# Patient Record
Sex: FEMALE | Race: BLACK OR AFRICAN AMERICAN | NOT HISPANIC OR LATINO
[De-identification: names, ages, dates, MRNs, and addresses within clinical notes are randomized per-mention and may not be internally consistent; named-entity substitution may affect disease eponyms.]

---

## 2021-02-25 ENCOUNTER — RESULT REVIEW (OUTPATIENT)
Age: 36
End: 2021-02-25

## 2021-03-22 PROBLEM — Z00.00 ENCOUNTER FOR PREVENTIVE HEALTH EXAMINATION: Status: ACTIVE | Noted: 2021-03-22

## 2021-03-25 ENCOUNTER — APPOINTMENT (OUTPATIENT)
Dept: OTOLARYNGOLOGY | Facility: CLINIC | Age: 36
End: 2021-03-25
Payer: COMMERCIAL

## 2021-03-25 VITALS
OXYGEN SATURATION: 100 % | WEIGHT: 180 LBS | SYSTOLIC BLOOD PRESSURE: 146 MMHG | BODY MASS INDEX: 25.77 KG/M2 | HEIGHT: 70 IN | HEART RATE: 82 BPM | DIASTOLIC BLOOD PRESSURE: 88 MMHG | TEMPERATURE: 97.8 F

## 2021-03-25 DIAGNOSIS — Z78.9 OTHER SPECIFIED HEALTH STATUS: ICD-10-CM

## 2021-03-25 DIAGNOSIS — R22.1 LOCALIZED SWELLING, MASS AND LUMP, NECK: ICD-10-CM

## 2021-03-25 DIAGNOSIS — Z82.49 FAMILY HISTORY OF ISCHEMIC HEART DISEASE AND OTHER DISEASES OF THE CIRCULATORY SYSTEM: ICD-10-CM

## 2021-03-25 DIAGNOSIS — E21.5 DISORDER OF PARATHYROID GLAND, UNSPECIFIED: ICD-10-CM

## 2021-03-25 DIAGNOSIS — Z86.79 PERSONAL HISTORY OF OTHER DISEASES OF THE CIRCULATORY SYSTEM: ICD-10-CM

## 2021-03-25 PROCEDURE — 76536 US EXAM OF HEAD AND NECK: CPT

## 2021-03-25 PROCEDURE — 31575 DIAGNOSTIC LARYNGOSCOPY: CPT

## 2021-03-25 PROCEDURE — 99072 ADDL SUPL MATRL&STAF TM PHE: CPT

## 2021-03-25 PROCEDURE — 99205 OFFICE O/P NEW HI 60 MIN: CPT | Mod: 25

## 2021-03-25 RX ORDER — AMLODIPINE AND OLMESARTAN MEDOXOMIL 10; 40 MG/1; MG/1
TABLET ORAL
Refills: 0 | Status: ACTIVE | COMMUNITY

## 2021-03-28 NOTE — PROCEDURE
[Image(s) Captured] : image(s) captured and filed [Unable to Cooperate with Mirror] : patient unable to cooperate with mirror [Gag Reflex] : gag reflex preventing mirror examination [Topical Lidocaine] : topical lidocaine [Oxymetazoline HCl] : oxymetazoline HCl [Flexible Endoscope] : examined with the flexible endoscope [Serial Number: ___] : Serial Number: [unfilled] [FreeTextEntry1] : Physician performed high-resolution ultrasound gray scale imaging and color Doppler supplementation of the thyroid gland and neck was obtained in the longitudinal and transverse planes using a 13 MHz linear transducer with image capture.  All measurements are in centimeters (longitudinal x AP x transverse).  \par  [FreeTextEntry2] : Primary hyperparathyroidism to rule out a parathyroid adenoma and or thyroid nodular disease. [FreeTextEntry3] : See dictated report [de-identified] : The nasal septum is minimally deviated to the left. There are no masses or polyps and the nasal mucosa and secretions are normal. The choanae and posterior nasopharynx are normal without masses or drainage. The Eustachian tube orifices appear patent. The pharynx, including the posterior and lateral pharyngeal walls, the vallecula and base of tongue are normal without ulcerations, lesions or masses. The hypopharynx including the pyriform sinuses open well without pooling of secretions, mucosal lesions or masses. The supraglottic larynx including the epiglottis, petiole, arytenoids, glossoepiglottic, aryepiglottic and pharyngoepiglottic folds are normal without mucosal lesions, ulcerations or masses. The glottis reveals normal false vocal folds. The true vocal folds are glistening white, tense and of equal length, without paralysis, having symmetric mobility on adduction and abduction. There are no mucosal lesions, nodules, cysts, erythroplasia or leukoplakia. The posterior cricoid area has healthy pink mucosa in the interarytenoid area and esophageal inlet. There is mild thickening/edema of the interarytenoid mucosa suggestive of posterior laryngitis from laryngopharyngeal acid reflux disease. The trachea is clear without narrowing in the immediate subglottic region, without deviation or lesions.  [de-identified] : Preoperative assessment, GERD

## 2021-03-28 NOTE — HISTORY OF PRESENT ILLNESS
[de-identified] : Iris is a generally healthy 35-year-old female  with the exception of controlled HTN who was first noted to have hypercalcemia in December 2020 (10.8 mg/dl)  that was initially monitored and then referred for Endocrine consultation.  Her last lab data is consistent with primary hyperparathyroidism with a serum calcium level of 10.6 mg/dl and a parathyroid hormone level of 72 pg/ml. Her renal function is normal and vit D 25-OH normal at 33.  Iris denies recent shortness of breath, voice changes, dysphagia, anterior neck pain, neck pressure or mass. There is no family history of thyroid cancer. Her mother is hypothyroid. Iris is euthyroid. She denies any known radiation exposures in her youth.  She denies calcium, vitamin D supplements, HCTZ or past use of Lithium Carbonate. There is no family history of nephrolithiasis or renal disease.  There is no history of fragility bone fractures. Other than chronic fatigue, muscle weakness, generalized bone aches, joint pain, memory loss, brain fog, nausea, constipation, polyuria/ polydipsia and GERD, she denies nephrolithiasis, peptic ulcer disease, pancreatitis, depression, vomiting or abdominal pain. She stated that she feels like an old lady due to the joint and bone pain and has not been able to exercise for ~ 1 year.  She is now referred for a surgical consultation to consider parathyroidectomy. The only neck imaging she had was a lymph node survey last month.  She had a nondiagnostic FNAB from a left level III LN that was borderline enlarged but since the FNA it has decreased in size and is being monitored. She denies fever, body aches, cough, cyanosis, chest burning, anosmia or recent known COVID exposures.  All family members at home are well.

## 2021-03-28 NOTE — CONSULT LETTER
[Dear  ___] : Dear  [unfilled], [Consult Letter:] : I had the pleasure of evaluating your patient, [unfilled]. [Please see my note below.] : Please see my note below. [Consult Closing:] : Thank you very much for allowing me to participate in the care of this patient.  If you have any questions, please do not hesitate to contact me. [Sincerely,] : Sincerely, [DrKyung  ___] : Dr. WORTHY [FreeTextEntry3] : \par Rommel Lam M.D., FACS, ECNU\par Director Center for Thyroid & Parathyroid Surgery\par [facility]\par Certified in Thyroid/Parathyroid/Neck Ultrasound, ECNU/ AIUM\par \par , Department of Otolaryngology\par [facility] [employer] at [facility]\par

## 2021-03-28 NOTE — DATA REVIEWED
[de-identified] : See HPI [de-identified] : See HPI [de-identified] : Endocrinology notes reviewed

## 2021-03-28 NOTE — REASON FOR VISIT
[FreeTextEntry2] : a surgical consultation concerning hypercalcemia and probable hyperparathyroidism [FreeTextEntry1] : Referred by Sara Watkins/Qasim Endocrinologist, PCP is Sunni Johnson MD

## 2021-04-05 ENCOUNTER — TRANSCRIPTION ENCOUNTER (OUTPATIENT)
Age: 36
End: 2021-04-05

## 2021-04-05 ENCOUNTER — APPOINTMENT (OUTPATIENT)
Dept: OTOLARYNGOLOGY | Facility: CLINIC | Age: 36
End: 2021-04-05
Payer: COMMERCIAL

## 2021-04-05 DIAGNOSIS — Z01.818 ENCOUNTER FOR OTHER PREPROCEDURAL EXAMINATION: ICD-10-CM

## 2021-04-05 PROCEDURE — ZZZZZ: CPT

## 2021-04-06 ENCOUNTER — TRANSCRIPTION ENCOUNTER (OUTPATIENT)
Age: 36
End: 2021-04-06

## 2021-04-06 VITALS
HEART RATE: 74 BPM | HEIGHT: 70 IN | OXYGEN SATURATION: 99 % | TEMPERATURE: 98 F | RESPIRATION RATE: 16 BRPM | SYSTOLIC BLOOD PRESSURE: 144 MMHG | DIASTOLIC BLOOD PRESSURE: 85 MMHG

## 2021-04-06 LAB — SARS-COV-2 N GENE NPH QL NAA+PROBE: NOT DETECTED

## 2021-04-06 NOTE — ASU PATIENT PROFILE, ADULT - PMH
Eczema    GERD (gastroesophageal reflux disease)    HTN (hypertension)    Hyperparathyroidism    IBS (irritable bowel syndrome)

## 2021-04-07 ENCOUNTER — RESULT REVIEW (OUTPATIENT)
Age: 36
End: 2021-04-07

## 2021-04-07 ENCOUNTER — APPOINTMENT (OUTPATIENT)
Dept: OTOLARYNGOLOGY | Facility: HOSPITAL | Age: 36
End: 2021-04-07

## 2021-04-07 ENCOUNTER — OUTPATIENT (OUTPATIENT)
Dept: INPATIENT UNIT | Facility: HOSPITAL | Age: 36
LOS: 1 days | Discharge: HOME | End: 2021-04-07
Payer: COMMERCIAL

## 2021-04-07 DIAGNOSIS — Z98.890 OTHER SPECIFIED POSTPROCEDURAL STATES: Chronic | ICD-10-CM

## 2021-04-07 LAB
ALBUMIN SERPL ELPH-MCNC: 3.7 G/DL — SIGNIFICANT CHANGE UP (ref 3.3–5)
ALBUMIN SERPL ELPH-MCNC: 3.8 G/DL — SIGNIFICANT CHANGE UP (ref 3.3–5)
ALP SERPL-CCNC: 66 U/L — SIGNIFICANT CHANGE UP (ref 40–120)
ALP SERPL-CCNC: 67 U/L — SIGNIFICANT CHANGE UP (ref 40–120)
ALT FLD-CCNC: 10 U/L — SIGNIFICANT CHANGE UP (ref 10–45)
ALT FLD-CCNC: 10 U/L — SIGNIFICANT CHANGE UP (ref 10–45)
ANION GAP SERPL CALC-SCNC: 10 MMOL/L — SIGNIFICANT CHANGE UP (ref 5–17)
ANION GAP SERPL CALC-SCNC: 11 MMOL/L — SIGNIFICANT CHANGE UP (ref 5–17)
AST SERPL-CCNC: 17 U/L — SIGNIFICANT CHANGE UP (ref 10–40)
AST SERPL-CCNC: 18 U/L — SIGNIFICANT CHANGE UP (ref 10–40)
BILIRUB SERPL-MCNC: 0.4 MG/DL — SIGNIFICANT CHANGE UP (ref 0.2–1.2)
BILIRUB SERPL-MCNC: 0.6 MG/DL — SIGNIFICANT CHANGE UP (ref 0.2–1.2)
BUN SERPL-MCNC: 8 MG/DL — SIGNIFICANT CHANGE UP (ref 7–23)
BUN SERPL-MCNC: 9 MG/DL — SIGNIFICANT CHANGE UP (ref 7–23)
CALCIUM SERPL-MCNC: 9.5 MG/DL — SIGNIFICANT CHANGE UP (ref 8.4–10.5)
CALCIUM SERPL-MCNC: 9.6 MG/DL — SIGNIFICANT CHANGE UP (ref 8.4–10.5)
CHLORIDE SERPL-SCNC: 102 MMOL/L — SIGNIFICANT CHANGE UP (ref 96–108)
CHLORIDE SERPL-SCNC: 102 MMOL/L — SIGNIFICANT CHANGE UP (ref 96–108)
CO2 SERPL-SCNC: 24 MMOL/L — SIGNIFICANT CHANGE UP (ref 22–31)
CO2 SERPL-SCNC: 24 MMOL/L — SIGNIFICANT CHANGE UP (ref 22–31)
CREAT SERPL-MCNC: 0.99 MG/DL — SIGNIFICANT CHANGE UP (ref 0.5–1.3)
CREAT SERPL-MCNC: 1.03 MG/DL — SIGNIFICANT CHANGE UP (ref 0.5–1.3)
GLUCOSE SERPL-MCNC: 117 MG/DL — HIGH (ref 70–99)
GLUCOSE SERPL-MCNC: 127 MG/DL — HIGH (ref 70–99)
HCT VFR BLD CALC: 38.5 % — SIGNIFICANT CHANGE UP (ref 34.5–45)
HGB BLD-MCNC: 12.8 G/DL — SIGNIFICANT CHANGE UP (ref 11.5–15.5)
MAGNESIUM SERPL-MCNC: 1.6 MG/DL — SIGNIFICANT CHANGE UP (ref 1.6–2.6)
MAGNESIUM SERPL-MCNC: 1.8 MG/DL — SIGNIFICANT CHANGE UP (ref 1.6–2.6)
MCHC RBC-ENTMCNC: 29.3 PG — SIGNIFICANT CHANGE UP (ref 27–34)
MCHC RBC-ENTMCNC: 33.2 GM/DL — SIGNIFICANT CHANGE UP (ref 32–36)
MCV RBC AUTO: 88.1 FL — SIGNIFICANT CHANGE UP (ref 80–100)
NRBC # BLD: 0 /100 WBCS — SIGNIFICANT CHANGE UP (ref 0–0)
PHOSPHATE SERPL-MCNC: 2.4 MG/DL — LOW (ref 2.5–4.5)
PHOSPHATE SERPL-MCNC: 2.4 MG/DL — LOW (ref 2.5–4.5)
PLATELET # BLD AUTO: 339 K/UL — SIGNIFICANT CHANGE UP (ref 150–400)
POTASSIUM SERPL-MCNC: 4.1 MMOL/L — SIGNIFICANT CHANGE UP (ref 3.5–5.3)
POTASSIUM SERPL-MCNC: 4.4 MMOL/L — SIGNIFICANT CHANGE UP (ref 3.5–5.3)
POTASSIUM SERPL-SCNC: 4.1 MMOL/L — SIGNIFICANT CHANGE UP (ref 3.5–5.3)
POTASSIUM SERPL-SCNC: 4.4 MMOL/L — SIGNIFICANT CHANGE UP (ref 3.5–5.3)
PROT SERPL-MCNC: 7.3 G/DL — SIGNIFICANT CHANGE UP (ref 6–8.3)
PROT SERPL-MCNC: 7.3 G/DL — SIGNIFICANT CHANGE UP (ref 6–8.3)
PTH-INTACT IO % DIF SERPL: 11.8 PG/ML — SIGNIFICANT CHANGE UP (ref 8.5–72.5)
PTH-INTACT IO % DIF SERPL: 119.4 PG/ML — HIGH (ref 8.5–72.5)
PTH-INTACT IO % DIF SERPL: 13.6 PG/ML — SIGNIFICANT CHANGE UP (ref 8.5–72.5)
PTH-INTACT IO % DIF SERPL: 22.3 PG/ML — SIGNIFICANT CHANGE UP (ref 8.5–72.5)
PTH-INTACT IO % DIF SERPL: 4.5 PG/ML — LOW (ref 8.5–72.5)
RBC # BLD: 4.37 M/UL — SIGNIFICANT CHANGE UP (ref 3.8–5.2)
RBC # FLD: 13.3 % — SIGNIFICANT CHANGE UP (ref 10.3–14.5)
SODIUM SERPL-SCNC: 136 MMOL/L — SIGNIFICANT CHANGE UP (ref 135–145)
SODIUM SERPL-SCNC: 137 MMOL/L — SIGNIFICANT CHANGE UP (ref 135–145)
WBC # BLD: 9.46 K/UL — SIGNIFICANT CHANGE UP (ref 3.8–10.5)
WBC # FLD AUTO: 9.46 K/UL — SIGNIFICANT CHANGE UP (ref 3.8–10.5)

## 2021-04-07 PROCEDURE — 60500 EXPLORE PARATHYROID GLANDS: CPT

## 2021-04-07 PROCEDURE — 38520 BIOPSY/REMOVAL LYMPH NODES: CPT

## 2021-04-07 PROCEDURE — 60512 AUTOTRANSPLANT PARATHYROID: CPT

## 2021-04-07 PROCEDURE — 88331 PATH CONSLTJ SURG 1 BLK 1SPC: CPT | Mod: 26

## 2021-04-07 PROCEDURE — 88305 TISSUE EXAM BY PATHOLOGIST: CPT | Mod: 26

## 2021-04-07 RX ORDER — SODIUM CHLORIDE 9 MG/ML
1000 INJECTION, SOLUTION INTRAVENOUS
Refills: 0 | Status: DISCONTINUED | OUTPATIENT
Start: 2021-04-07 | End: 2021-04-08

## 2021-04-07 RX ORDER — CALCITRIOL 0.5 UG/1
0.5 CAPSULE ORAL DAILY
Refills: 0 | Status: DISCONTINUED | OUTPATIENT
Start: 2021-04-07 | End: 2021-04-08

## 2021-04-07 RX ORDER — ACETAMINOPHEN 500 MG
650 TABLET ORAL ONCE
Refills: 0 | Status: DISCONTINUED | OUTPATIENT
Start: 2021-04-07 | End: 2021-04-08

## 2021-04-07 RX ORDER — ONDANSETRON 8 MG/1
4 TABLET, FILM COATED ORAL ONCE
Refills: 0 | Status: DISCONTINUED | OUTPATIENT
Start: 2021-04-07 | End: 2021-04-08

## 2021-04-07 RX ADMIN — CALCITRIOL 0.5 MICROGRAM(S): 0.5 CAPSULE ORAL at 21:47

## 2021-04-07 NOTE — PROGRESS NOTE ADULT - SUBJECTIVE AND OBJECTIVE BOX
Procedure: subtotal parathyroidectomy   Surgeon: Genaro    S: Pt has no complaints. Denies CP, SOB, FLORES, calf tenderness. Pain controlled with medication. Denies numbness, tingling or weakness.    O:  T(C): 36.6 (04-07-21 @ 20:15), Max: 36.6 (04-07-21 @ 20:15)  T(F): 97.8 (04-07-21 @ 20:15), Max: 97.8 (04-07-21 @ 20:15)  HR: 63 (04-07-21 @ 20:15) (55 - 65)  BP: 138/86 (04-07-21 @ 20:15) (128/77 - 138/86)  RR: 18 (04-07-21 @ 20:15) (16 - 24)  SpO2: 99% (04-07-21 @ 20:15) (99% - 100%)  Wt(kg): --                        12.8   9.46  )-----------( 339      ( 07 Apr 2021 19:44 )             38.5     04-07    136  |  102  |  8   ----------------------------<  127<H>  4.1   |  24  |  0.99    Ca    9.6      07 Apr 2021 19:43  Phos  2.4     04-07  Mg     1.8     04-07    TPro  7.3  /  Alb  3.8  /  TBili  0.4  /  DBili  x   /  AST  18  /  ALT  10  /  AlkPhos  66  04-07      Gen: NAD, resting comfortably in bed  NECK: Incision clean and dry without bleeding or swelling. LEVI intact with minimal SS fluid. Negative Chvostek sign.  C/V: NSR  Pulm: Nonlabored breathing, no respiratory distress  Extrem: WWP, no calf edema, SCDs in place      A/P: 36yFemale s/p subtotal parathyroidectomy   Diet: reg diet  IVF: LR until tolerating  Pain/nausea control  DVT ppx: scds

## 2021-04-07 NOTE — PACU DISCHARGE NOTE - COMMENTS
Verbal report given to DOLORES Pretty, v/s nuvia, pt. to be transferred to room 9617, voucing no complaints

## 2021-04-07 NOTE — BRIEF OPERATIVE NOTE - OPERATION/FINDINGS
Subtotal parathyroidectomy via 5cm transverse neck incision. Abnormally large right upper and lower and left upper parathyroid glands removed. Two lymph nodes sampled, one on each side; right side ectopic thyroid tissue, left side normal lymph node. Bilateral recurrent and superior laryngeal and vagus nerves identified and preserved. Adequate blood flow to remaining parathyroid gland. Preop .4, after removal of right parathyroid glands 22.3, after removal of 3 parathyroid glands 13.6 and 10 minutes later 11.8. Subtotal parathyroidectomy via 5cm transverse neck incision. Abnormally large right upper and lower and left upper parathyroid glands removed. Two lymph nodes sampled, one on each side; right side ectopic thyroid tissue, left side normal lymph node. Bilateral recurrent and superior laryngeal and vagus nerves identified and preserved. Adequate blood flow to remaining parathyroid gland. Preop .4, after removal of right parathyroid glands 22.3, after removal of 3 parathyroid glands 13.6 and 10 minutes later 11.8. Autotransplantation of left superior parathyroid gland into left brachioradialis.

## 2021-04-08 VITALS
HEART RATE: 62 BPM | OXYGEN SATURATION: 100 % | DIASTOLIC BLOOD PRESSURE: 82 MMHG | SYSTOLIC BLOOD PRESSURE: 131 MMHG | RESPIRATION RATE: 16 BRPM | TEMPERATURE: 99 F

## 2021-04-08 LAB
ALBUMIN SERPL ELPH-MCNC: 3.4 G/DL — SIGNIFICANT CHANGE UP (ref 3.3–5)
ALP SERPL-CCNC: 59 U/L — SIGNIFICANT CHANGE UP (ref 40–120)
ALT FLD-CCNC: 8 U/L — LOW (ref 10–45)
ANION GAP SERPL CALC-SCNC: 10 MMOL/L — SIGNIFICANT CHANGE UP (ref 5–17)
ANION GAP SERPL CALC-SCNC: 9 MMOL/L — SIGNIFICANT CHANGE UP (ref 5–17)
AST SERPL-CCNC: 16 U/L — SIGNIFICANT CHANGE UP (ref 10–40)
BILIRUB SERPL-MCNC: 0.7 MG/DL — SIGNIFICANT CHANGE UP (ref 0.2–1.2)
BUN SERPL-MCNC: 11 MG/DL — SIGNIFICANT CHANGE UP (ref 7–23)
BUN SERPL-MCNC: 11 MG/DL — SIGNIFICANT CHANGE UP (ref 7–23)
CALCIUM SERPL-MCNC: 10 MG/DL — SIGNIFICANT CHANGE UP (ref 8.4–10.5)
CALCIUM SERPL-MCNC: 9.5 MG/DL — SIGNIFICANT CHANGE UP (ref 8.4–10.5)
CHLORIDE SERPL-SCNC: 102 MMOL/L — SIGNIFICANT CHANGE UP (ref 96–108)
CHLORIDE SERPL-SCNC: 103 MMOL/L — SIGNIFICANT CHANGE UP (ref 96–108)
CO2 SERPL-SCNC: 25 MMOL/L — SIGNIFICANT CHANGE UP (ref 22–31)
CO2 SERPL-SCNC: 25 MMOL/L — SIGNIFICANT CHANGE UP (ref 22–31)
CREAT SERPL-MCNC: 1.17 MG/DL — SIGNIFICANT CHANGE UP (ref 0.5–1.3)
CREAT SERPL-MCNC: 1.2 MG/DL — SIGNIFICANT CHANGE UP (ref 0.5–1.3)
GLUCOSE SERPL-MCNC: 84 MG/DL — SIGNIFICANT CHANGE UP (ref 70–99)
GLUCOSE SERPL-MCNC: 96 MG/DL — SIGNIFICANT CHANGE UP (ref 70–99)
HCT VFR BLD CALC: 36.4 % — SIGNIFICANT CHANGE UP (ref 34.5–45)
HGB BLD-MCNC: 11.8 G/DL — SIGNIFICANT CHANGE UP (ref 11.5–15.5)
MAGNESIUM SERPL-MCNC: 1.6 MG/DL — SIGNIFICANT CHANGE UP (ref 1.6–2.6)
MAGNESIUM SERPL-MCNC: 1.9 MG/DL — SIGNIFICANT CHANGE UP (ref 1.6–2.6)
MCHC RBC-ENTMCNC: 28.6 PG — SIGNIFICANT CHANGE UP (ref 27–34)
MCHC RBC-ENTMCNC: 32.4 GM/DL — SIGNIFICANT CHANGE UP (ref 32–36)
MCV RBC AUTO: 88.3 FL — SIGNIFICANT CHANGE UP (ref 80–100)
NRBC # BLD: 0 /100 WBCS — SIGNIFICANT CHANGE UP (ref 0–0)
PHOSPHATE SERPL-MCNC: 3.4 MG/DL — SIGNIFICANT CHANGE UP (ref 2.5–4.5)
PHOSPHATE SERPL-MCNC: 3.7 MG/DL — SIGNIFICANT CHANGE UP (ref 2.5–4.5)
PLATELET # BLD AUTO: 309 K/UL — SIGNIFICANT CHANGE UP (ref 150–400)
POTASSIUM SERPL-MCNC: 3.7 MMOL/L — SIGNIFICANT CHANGE UP (ref 3.5–5.3)
POTASSIUM SERPL-MCNC: 4 MMOL/L — SIGNIFICANT CHANGE UP (ref 3.5–5.3)
POTASSIUM SERPL-SCNC: 3.7 MMOL/L — SIGNIFICANT CHANGE UP (ref 3.5–5.3)
POTASSIUM SERPL-SCNC: 4 MMOL/L — SIGNIFICANT CHANGE UP (ref 3.5–5.3)
PROT SERPL-MCNC: 6.9 G/DL — SIGNIFICANT CHANGE UP (ref 6–8.3)
PTH-INTACT IO % DIF SERPL: 4.7 PG/ML — LOW (ref 8.5–72.5)
PTH-INTACT IO % DIF SERPL: <4 PG/ML — LOW (ref 8.5–72.5)
PTH-INTACT IO % DIF SERPL: <4 PG/ML — LOW (ref 8.5–72.5)
RBC # BLD: 4.12 M/UL — SIGNIFICANT CHANGE UP (ref 3.8–5.2)
RBC # FLD: 13.4 % — SIGNIFICANT CHANGE UP (ref 10.3–14.5)
SODIUM SERPL-SCNC: 137 MMOL/L — SIGNIFICANT CHANGE UP (ref 135–145)
SODIUM SERPL-SCNC: 137 MMOL/L — SIGNIFICANT CHANGE UP (ref 135–145)
WBC # BLD: 12.2 K/UL — HIGH (ref 3.8–10.5)
WBC # FLD AUTO: 12.2 K/UL — HIGH (ref 3.8–10.5)

## 2021-04-08 PROCEDURE — 38510 BIOPSY/REMOVAL LYMPH NODES: CPT

## 2021-04-08 PROCEDURE — 36415 COLL VENOUS BLD VENIPUNCTURE: CPT

## 2021-04-08 PROCEDURE — 83970 ASSAY OF PARATHORMONE: CPT

## 2021-04-08 PROCEDURE — 84100 ASSAY OF PHOSPHORUS: CPT

## 2021-04-08 PROCEDURE — 88331 PATH CONSLTJ SURG 1 BLK 1SPC: CPT

## 2021-04-08 PROCEDURE — 80053 COMPREHEN METABOLIC PANEL: CPT

## 2021-04-08 PROCEDURE — 80048 BASIC METABOLIC PNL TOTAL CA: CPT

## 2021-04-08 PROCEDURE — 85027 COMPLETE CBC AUTOMATED: CPT

## 2021-04-08 PROCEDURE — 88305 TISSUE EXAM BY PATHOLOGIST: CPT

## 2021-04-08 PROCEDURE — 60500 EXPLORE PARATHYROID GLANDS: CPT

## 2021-04-08 PROCEDURE — 83735 ASSAY OF MAGNESIUM: CPT

## 2021-04-08 RX ADMIN — CALCITRIOL 0.5 MICROGRAM(S): 0.5 CAPSULE ORAL at 11:39

## 2021-04-08 NOTE — PROVIDER CONTACT NOTE (CRITICAL VALUE NOTIFICATION) - BACKGROUND
Patient is s/p subtotal parathyroidectomy.
Patient is s/p subtotal parathyroidectomy.
s/p subtotal parathyroidectomy.

## 2021-04-08 NOTE — PROGRESS NOTE ADULT - SUBJECTIVE AND OBJECTIVE BOX
SUBJECTIVE: No acute events overnight. Pt feeling well this AM. Was not able to tolerate clears last night, will attempt again this AM. Denies any hoarseness, pain controlled.      Vital Signs Last 24 Hrs  T(C): 36.8 (08 Apr 2021 08:38), Max: 37.1 (08 Apr 2021 05:01)  T(F): 98.3 (08 Apr 2021 08:38), Max: 98.8 (08 Apr 2021 05:01)  HR: 76 (08 Apr 2021 08:38) (52 - 81)  BP: 104/68 (08 Apr 2021 08:38) (104/68 - 146/82)  BP(mean): 104 (07 Apr 2021 19:45) (90 - 109)  RR: 16 (08 Apr 2021 08:38) (14 - 24)  SpO2: 97% (08 Apr 2021 08:38) (97% - 100%)  I&O's Summary    07 Apr 2021 07:01  -  08 Apr 2021 07:00  --------------------------------------------------------  IN: 650 mL / OUT: 440 mL / NET: 210 mL        Gen: NAD, resting comfortably in bed  NECK: Incision clean and dry without bleeding or swelling. LEVI intact with minimal SS fluid. Negative Chvostek sign.  C/V: NSR  Pulm: Nonlabored breathing, no respiratory distress  Extrem: WWP, no calf edema, SCDs in place      LABS:                        12.8   9.46  )-----------( 339      ( 07 Apr 2021 19:44 )             38.5     04-08    137  |  102  |  11  ----------------------------<  96  4.0   |  25  |  1.20    Ca    9.5      08 Apr 2021 07:33  Phos  3.7     04-08  Mg     1.6     04-08    TPro  6.9  /  Alb  3.4  /  TBili  0.7  /  DBili  x   /  AST  16  /  ALT  8<L>  /  AlkPhos  59  04-08

## 2021-04-08 NOTE — PROVIDER CONTACT NOTE (CRITICAL VALUE NOTIFICATION) - ACTION/TREATMENT ORDERED:
Patient will be given STAT Calcitriol capsule 0.5 mcg PO, and patient will be given Citracal calcium supplement capsule x2 PO TID.
None.
none

## 2021-04-08 NOTE — PROGRESS NOTE ADULT - ASSESSMENT
A/P: 36yFemale s/p subtotal parathyroidectomy on 4/7 by Dr. Lam    Diet: reg diet  IVF: LR until tolerating  Pain/nausea control  Continue Citracal 2 tabs TID, Rocaltrol  AM IOPTH < 4.0, Ca wnl  F/u Noon labs today, if Ca stable can plan for discharge under 23hr obs  DVT ppx: scds

## 2021-04-10 LAB — SURGICAL PATHOLOGY STUDY: SIGNIFICANT CHANGE UP

## 2021-04-13 ENCOUNTER — APPOINTMENT (OUTPATIENT)
Dept: OTOLARYNGOLOGY | Facility: CLINIC | Age: 36
End: 2021-04-13
Payer: COMMERCIAL

## 2021-04-13 VITALS
HEART RATE: 69 BPM | SYSTOLIC BLOOD PRESSURE: 144 MMHG | OXYGEN SATURATION: 100 % | TEMPERATURE: 98 F | DIASTOLIC BLOOD PRESSURE: 88 MMHG

## 2021-04-13 DIAGNOSIS — J04.0 ACUTE LARYNGITIS: ICD-10-CM

## 2021-04-13 DIAGNOSIS — K21.9 ACUTE LARYNGITIS: ICD-10-CM

## 2021-04-13 PROBLEM — L30.9 DERMATITIS, UNSPECIFIED: Chronic | Status: ACTIVE | Noted: 2021-04-06

## 2021-04-13 PROBLEM — E21.3 HYPERPARATHYROIDISM, UNSPECIFIED: Chronic | Status: ACTIVE | Noted: 2021-04-06

## 2021-04-13 PROBLEM — K58.9 IRRITABLE BOWEL SYNDROME WITHOUT DIARRHEA: Chronic | Status: ACTIVE | Noted: 2021-04-06

## 2021-04-13 PROBLEM — I10 ESSENTIAL (PRIMARY) HYPERTENSION: Chronic | Status: ACTIVE | Noted: 2021-04-06

## 2021-04-13 PROCEDURE — 31575 DIAGNOSTIC LARYNGOSCOPY: CPT | Mod: 58

## 2021-04-13 PROCEDURE — 99024 POSTOP FOLLOW-UP VISIT: CPT

## 2021-04-13 RX ORDER — AZITHROMYCIN 500 MG/1
500 TABLET, FILM COATED ORAL DAILY
Qty: 1 | Refills: 0 | Status: COMPLETED | COMMUNITY
Start: 2021-04-06 | End: 2021-04-13

## 2021-04-13 RX ORDER — ACETAMINOPHEN AND CODEINE 300; 30 MG/1; MG/1
300-30 TABLET ORAL
Qty: 30 | Refills: 0 | Status: COMPLETED | COMMUNITY
Start: 2021-04-06 | End: 2021-04-13

## 2021-04-13 NOTE — PHYSICAL EXAM
[Normal] : no mass and no adenopathy [de-identified] : The neck is flat without seroma or hematoma. The subcuticular suture was removed. The voice is raspy.  A Chvostek sign was present.

## 2021-04-13 NOTE — PROCEDURE
[Image(s) Captured] : image(s) captured and filed [Unable to Cooperate with Mirror] : patient unable to cooperate with mirror [Gag Reflex] : gag reflex preventing mirror examination [Topical Lidocaine] : topical lidocaine [Oxymetazoline HCl] : oxymetazoline HCl [Flexible Endoscope] : examined with the flexible endoscope [Serial Number: ___] : Serial Number: [unfilled] [Hoarseness] : hoarseness not clearly evaluated by indirect laryngoscopy [de-identified] : The nasal septum is minimally deviated to the left. There are no masses or polyps and the nasal mucosa and secretions are normal. The choanae and posterior nasopharynx are normal without masses or drainage. The Eustachian tube orifices appear patent. The pharynx, including the posterior and lateral pharyngeal walls, the vallecula and base of tongue are normal without ulcerations, lesions or masses. The hypopharynx including the pyriform sinuses open well without pooling of secretions, mucosal lesions or masses. The supraglottic larynx including the epiglottis, petiole, arytenoids, glossoepiglottic, aryepiglottic and pharyngoepiglottic folds are normal without mucosal lesions, ulcerations or masses. The glottis reveals normal false vocal folds. The true vocal folds are hyperemic but tense and of equal length, without paralysis, having symmetric mobility on adduction and abduction. There are no mucosal lesions, nodules, cysts, erythroplasia or leukoplakia. The posterior cricoid area has healthy pink mucosa in the interarytenoid area and esophageal inlet. There is mild thickening/edema of the interarytenoid mucosa suggestive of posterior laryngitis from laryngopharyngeal acid reflux disease. The trachea is clear without narrowing in the immediate subglottic region, without deviation or lesions.  [de-identified] : postoperative assessment, GERD

## 2021-04-13 NOTE — REASON FOR VISIT
[FreeTextEntry2] : a first postop visit after subtotal parathyroidectomy [FreeTextEntry1] : Referred by Sara Watkins/Qasim Endocrinologist, PCP is Sunni Johnson MD

## 2021-04-13 NOTE — HISTORY OF PRESENT ILLNESS
[FreeTextEntry1] : Iris returns for a first post op visit after an uncomplicated subtotal parathyroidectomy on 04/07/2021.  She denies paresthesias and is taking 2 Citracals TID and Rocaltrol 0.5 mcg daily.  She had paresthesias over the weekend that resolved after increasing her calcium intake to TID.  Voice is minimally hoarse. Surgical pathology revealed three hypercellular parathyroid glands consistent with hyperplasia. \par  [de-identified] : Iris is a generally healthy 35-year-old female  with the exception of controlled HTN who was first noted to have hypercalcemia in December 2020 (10.8 mg/dl)  that was initially monitored and then referred for Endocrine consultation.  Her last lab data is consistent with primary hyperparathyroidism with a serum calcium level of 10.6 mg/dl and a parathyroid hormone level of 72 pg/ml. Her renal function is normal and vit D 25-OH normal at 33.  Iris denies recent shortness of breath, voice changes, dysphagia, anterior neck pain, neck pressure or mass. There is no family history of thyroid cancer. Her mother is hypothyroid. Iris is euthyroid. She denies any known radiation exposures in her youth.  She denies calcium, vitamin D supplements, HCTZ or past use of Lithium Carbonate. There is no family history of nephrolithiasis or renal disease.  There is no history of fragility bone fractures. Other than chronic fatigue, muscle weakness, generalized bone aches, joint pain, memory loss, brain fog, nausea, constipation, polyuria/ polydipsia and GERD, she denies nephrolithiasis, peptic ulcer disease, pancreatitis, depression, vomiting or abdominal pain. She stated that she feels like an old lady due to the joint and bone pain and has not been able to exercise for ~ 1 year.  She is now referred for a surgical consultation to consider parathyroidectomy. The only neck imaging she had was a lymph node survey last month.  She had a nondiagnostic FNAB from a left level III LN that was borderline enlarged but since the FNA it has decreased in size and is being monitored. She denies fever, body aches, cough, cyanosis, chest burning, anosmia or recent known COVID exposures.  All family members at home are well. \par

## 2021-04-13 NOTE — CONSULT LETTER
[Dear  ___] : Dear  [unfilled], [Consult Letter:] : I had the pleasure of evaluating your patient, [unfilled]. [Please see my note below.] : Please see my note below. [Consult Closing:] : Thank you very much for allowing me to participate in the care of this patient.  If you have any questions, please do not hesitate to contact me. [Sincerely,] : Sincerely, [DrKyung  ___] : Dr. WORTHY [FreeTextEntry3] : \par Rommel Lam M.D., FACS, ECNU\par Director Center for Thyroid & Parathyroid Surgery\par [facility]\par Certified in Thyroid/Parathyroid/Neck Ultrasound, ECNU/ AIUM\par \par , Department of Otolaryngology\par [facility] [employer] [facility] at [facility]\par

## 2021-04-14 LAB
25(OH)D3 SERPL-MCNC: 28.1 NG/ML
ALBUMIN SERPL ELPH-MCNC: 4.1 G/DL
ALP BLD-CCNC: 64 U/L
ALT SERPL-CCNC: 11 U/L
ANION GAP SERPL CALC-SCNC: 9 MMOL/L
AST SERPL-CCNC: 14 U/L
BILIRUB SERPL-MCNC: 0.3 MG/DL
BUN SERPL-MCNC: 10 MG/DL
CALCIUM SERPL-MCNC: 9.3 MG/DL
CALCIUM SERPL-MCNC: 9.3 MG/DL
CHLORIDE SERPL-SCNC: 103 MMOL/L
CO2 SERPL-SCNC: 26 MMOL/L
CREAT SERPL-MCNC: 1.05 MG/DL
GLUCOSE SERPL-MCNC: 93 MG/DL
MAGNESIUM SERPL-MCNC: 1.8 MG/DL
PARATHYROID HORMONE INTACT: 16 PG/ML
PHOSPHATE SERPL-MCNC: 3.9 MG/DL
POTASSIUM SERPL-SCNC: 4.1 MMOL/L
PROT SERPL-MCNC: 7.1 G/DL
SODIUM SERPL-SCNC: 138 MMOL/L

## 2021-04-22 ENCOUNTER — APPOINTMENT (OUTPATIENT)
Dept: OTOLARYNGOLOGY | Facility: CLINIC | Age: 36
End: 2021-04-22
Payer: COMMERCIAL

## 2021-04-22 VITALS
SYSTOLIC BLOOD PRESSURE: 134 MMHG | OXYGEN SATURATION: 100 % | TEMPERATURE: 97.2 F | DIASTOLIC BLOOD PRESSURE: 89 MMHG | HEART RATE: 68 BPM

## 2021-04-22 DIAGNOSIS — D44.0 NEOPLASM OF UNCERTAIN BEHAVIOR OF THYROID GLAND: ICD-10-CM

## 2021-04-22 DIAGNOSIS — E21.0 PRIMARY HYPERPARATHYROIDISM: ICD-10-CM

## 2021-04-22 DIAGNOSIS — R49.9 UNSPECIFIED VOICE AND RESONANCE DISORDER: ICD-10-CM

## 2021-04-22 DIAGNOSIS — D35.1 BENIGN NEOPLASM OF PARATHYROID GLAND: ICD-10-CM

## 2021-04-22 DIAGNOSIS — E89.2 POSTPROCEDURAL HYPOPARATHYROIDISM: ICD-10-CM

## 2021-04-22 PROCEDURE — 99024 POSTOP FOLLOW-UP VISIT: CPT

## 2021-04-22 NOTE — PHYSICAL EXAM
[Normal] : no mass and no adenopathy [de-identified] : The incision is healing well and steri strips replaced.

## 2021-04-22 NOTE — HISTORY OF PRESENT ILLNESS
[de-identified] : Iris is a generally healthy 35-year-old female  with the exception of controlled HTN who was first noted to have hypercalcemia in December 2020 (10.8 mg/dl)  that was initially monitored and then referred for Endocrine consultation.  Her last lab data is consistent with primary hyperparathyroidism with a serum calcium level of 10.6 mg/dl and a parathyroid hormone level of 72 pg/ml. Her renal function is normal and vit D 25-OH normal at 33.  Iris denies recent shortness of breath, voice changes, dysphagia, anterior neck pain, neck pressure or mass. There is no family history of thyroid cancer. Her mother is hypothyroid. Iris is euthyroid. She denies any known radiation exposures in her youth.  She denies calcium, vitamin D supplements, HCTZ or past use of Lithium Carbonate. There is no family history of nephrolithiasis or renal disease.  There is no history of fragility bone fractures. Other than chronic fatigue, muscle weakness, generalized bone aches, joint pain, memory loss, brain fog, nausea, constipation, polyuria/ polydipsia and GERD, she denies nephrolithiasis, peptic ulcer disease, pancreatitis, depression, vomiting or abdominal pain. She stated that she feels like an old lady due to the joint and bone pain and has not been able to exercise for ~ 1 year.  She is now referred for a surgical consultation to consider parathyroidectomy. The only neck imaging she had was a lymph node survey last month.  She had a nondiagnostic FNAB from a left level III LN that was borderline enlarged but since the FNA it has decreased in size and is being monitored. She denies fever, body aches, cough, cyanosis, chest burning, anosmia or recent known COVID exposures.  All family members at home are well. \par  [FreeTextEntry1] : Iris returns for a 2nd post op visit after an uncomplicated subtotal parathyroidectomy on 04/07/2021.  She denies paresthesias and is taking 2 Citracals BID and Rocaltrol 0.5 mcg every day.  Her labs are now normal and has no paresthesias.  Voice is less hoarse. Surgical pathology revealed three hypercellular parathyroid glands consistent with hyperplasia. \par

## 2021-04-22 NOTE — CONSULT LETTER
[Dear  ___] : Dear  [unfilled], [Consult Letter:] : I had the pleasure of evaluating your patient, [unfilled]. [Please see my note below.] : Please see my note below. [Consult Closing:] : Thank you very much for allowing me to participate in the care of this patient.  If you have any questions, please do not hesitate to contact me. [Sincerely,] : Sincerely, [DrKyung  ___] : Dr. WORTHY [FreeTextEntry3] : \par Rommel Lam M.D., FACS, ECNU\par Director Center for Thyroid & Parathyroid Surgery\par [facility] [place] [facility]\par Certified in Thyroid/Parathyroid/Neck Ultrasound, ECNU/ AIUM\par \par , Department of Otolaryngology\par [facility] [employer] at [facility]\par

## 2021-04-22 NOTE — REASON FOR VISIT
[FreeTextEntry2] : a 2nd postop visit after subtotal parathyroidectomy [FreeTextEntry1] : Referred by Sara Watkins/Qasim Endocrinologist, PCP is Sunni Johnson MD

## 2021-07-20 ENCOUNTER — APPOINTMENT (OUTPATIENT)
Dept: OTOLARYNGOLOGY | Facility: CLINIC | Age: 36
End: 2021-07-20

## 2022-04-11 PROBLEM — J04.0 REFLUX LARYNGITIS: Status: ACTIVE | Noted: 2021-03-25

## 2023-08-03 NOTE — ASU PATIENT PROFILE, ADULT - AS SC BRADEN ACTIVITY
(4) walks frequently Isotretinoin Counseling: Patient should get monthly blood tests, not donate blood, not drive at night if vision affected, not share medication, and not undergo elective surgery for 6 months after tx completed. Side effects reviewed, pt to contact office should one occur.